# Patient Record
Sex: FEMALE | Race: WHITE | Employment: OTHER | URBAN - METROPOLITAN AREA
[De-identification: names, ages, dates, MRNs, and addresses within clinical notes are randomized per-mention and may not be internally consistent; named-entity substitution may affect disease eponyms.]

---

## 2024-06-26 ENCOUNTER — OFFICE VISIT (OUTPATIENT)
Dept: ORTHOPEDIC SURGERY | Age: 69
End: 2024-06-26
Payer: COMMERCIAL

## 2024-06-26 DIAGNOSIS — S52.041A CLOSED DISPLACED FRACTURE OF CORONOID PROCESS OF RIGHT ULNA, INITIAL ENCOUNTER: ICD-10-CM

## 2024-06-26 DIAGNOSIS — S53.124A CLOSED POSTERIOR DISLOCATION OF RIGHT ELBOW, INITIAL ENCOUNTER: Primary | ICD-10-CM

## 2024-06-26 DIAGNOSIS — S52.531A: ICD-10-CM

## 2024-06-26 PROCEDURE — 1123F ACP DISCUSS/DSCN MKR DOCD: CPT | Performed by: ORTHOPAEDIC SURGERY

## 2024-06-26 PROCEDURE — 99204 OFFICE O/P NEW MOD 45 MIN: CPT | Performed by: ORTHOPAEDIC SURGERY

## 2024-06-26 NOTE — PROGRESS NOTES
Name: Dot Howell  YOB: 1955  Gender: female  MRN: [id number]      What: Right elbow dislocation and a right wrist fracture  How: A fall  When: Late in the evening 6/7/2024 early 6/8/2024    Referring provider: Dr. Olivarez    HPI: Dot Howell is a 68 y.o. left-hand-dominant female seen at the request of Dr. Olivarez for right elbow and right wrist problems.  She has a history of hypertensive coronary artery disease, peripheral vascular disease with stents currently on Plavix, COPD currently smoking with a recent exacerbation, diabetes mellitus with recent sugars elevated in the 400s on metformin, MEHREEN.  Her osteopenia/osteoporosis status is unknown.  She denies any previous right elbow or right wrist problems.  She fell late 6/7/2024 early 6/8/2024 injuring her right elbow and right wrist.  She presented to the [facility] emergency room.  X-rays demonstrated a posterior right elbow dislocation and a displaced Colles' fracture right wrist.  She underwent a closed reduction of her right elbow and right wrist.  She was splinted.  Postreduction films demonstrated the elbow to be reduced and the wrist fracture to be adequately aligned.  She was admitted overnight for observation.  Apparently the next day she felt a pop and felt like the right elbow had redislocated.  She was discharged home 6/9/2024.  She was then readmitted 6/17-6/22/2024 for a COPD exacerbation.  She finally presented to follow-up to Dr. Olivarez's office today.  X-rays demonstrated a posterior right elbow dislocation with an associated coronoid fracture.  Due to the complexity of the injury I was contacted.  I was unaware that she had a concomitant right distal radius fracture.  She denies any numbness or tingling.  She denies any left-sided problems.      ROS/Meds/PSH/PMH/FH/SH: A ten system review of systems was performed and is negative other than what is in the HPI.   Tobacco:  reports that she has been smoking cigarettes. She has never

## 2024-06-27 ENCOUNTER — TELEPHONE (OUTPATIENT)
Dept: ORTHOPEDIC SURGERY | Age: 69
End: 2024-06-27

## 2024-06-28 NOTE — TELEPHONE ENCOUNTER
I called and spoke with patient, got her rescheduled for the next available clinic day 7/8/2024.  Patient voiced understanding.

## 2024-07-01 ENCOUNTER — TELEPHONE (OUTPATIENT)
Dept: ORTHOPEDIC SURGERY | Age: 69
End: 2024-07-01

## 2024-07-01 NOTE — TELEPHONE ENCOUNTER
Called and spoke with the patient, asked the patient if she would be able to do a morning appointment for her CT scan to be able to get an appointment sooner with Dr. Silvestre, she said she prefers the afternoon appointment.

## 2024-07-08 ENCOUNTER — TELEPHONE (OUTPATIENT)
Age: 69
End: 2024-07-08

## 2024-07-08 NOTE — TELEPHONE ENCOUNTER
We noticed patient canceled her appointment with Dr. Silvestre for today.  She is currently admitted at [facility].  I left a VM for patient advising that she get an ortho consult while she's in the hospital because of her dislocated elbow.  I advised her to please call us back with any questions/concerns.  I also left a VM for her emergency contact to please call us back because we are trying to get in touch with her.   Yes

## 2024-07-12 DIAGNOSIS — S52.041A CLOSED DISPLACED FRACTURE OF CORONOID PROCESS OF RIGHT ULNA, INITIAL ENCOUNTER: ICD-10-CM

## 2024-07-12 DIAGNOSIS — S53.124A CLOSED POSTERIOR DISLOCATION OF RIGHT ELBOW, INITIAL ENCOUNTER: ICD-10-CM

## 2024-07-15 ENCOUNTER — TELEPHONE (OUTPATIENT)
Age: 69
End: 2024-07-15

## 2024-07-15 NOTE — TELEPHONE ENCOUNTER
Per Dr. Silvestre, I called and spoke with patient.  Got her scheduled to see Dr. Silvestre on 7/23/2024 at 2:30 p.m. at [facility].  Patient voiced understanding, states she will call us if she is unable to make it.

## 2024-07-24 ENCOUNTER — TELEPHONE (OUTPATIENT)
Dept: ORTHOPEDIC SURGERY | Age: 69
End: 2024-07-24

## 2024-07-24 NOTE — TELEPHONE ENCOUNTER
Pt would like to reschedule her NP appt. I saw Pt has cx a couple times before. Is it okay to reschedule ?

## 2024-07-24 NOTE — TELEPHONE ENCOUNTER
Per Dr. Silvestre, I left a VM for patient stating I was calling to get her scheduled to see Dr. Silvestre.  I advised that the soonest we could see her would be next week  I also advised that because she's canceled so many appointments, if she cancels another one we may not be able to reschedule her.  Advised her to please call me back with any questions.

## 2024-07-30 ENCOUNTER — OFFICE VISIT (OUTPATIENT)
Age: 69
End: 2024-07-30
Payer: COMMERCIAL

## 2024-07-30 DIAGNOSIS — S53.124A CLOSED POSTERIOR DISLOCATION OF RIGHT ELBOW, INITIAL ENCOUNTER: Primary | ICD-10-CM

## 2024-07-30 DIAGNOSIS — S52.531A CLOSED COLLES' FRACTURE OF RIGHT RADIUS, INITIAL ENCOUNTER: ICD-10-CM

## 2024-07-30 DIAGNOSIS — M25.531 RIGHT WRIST PAIN: ICD-10-CM

## 2024-07-30 PROCEDURE — 1123F ACP DISCUSS/DSCN MKR DOCD: CPT | Performed by: ORTHOPAEDIC SURGERY

## 2024-07-30 PROCEDURE — 99205 OFFICE O/P NEW HI 60 MIN: CPT | Performed by: ORTHOPAEDIC SURGERY

## 2024-08-01 NOTE — PROGRESS NOTES
Orthopaedic Hand Clinic Note    Name: Dot Howell  YOB: 1955  Gender: female  MRN: [id number]      CC: Patient referred for evaluation of upper extremity pain    HPI: Dot Howell is a 68 y.o. female with a chief complaint of injury to her right wrist and elbow which occurred as a result of a fall on 6/8/2024.  She has a complicated past medical history of COPD and emphysema, with multiple recent episodes of acute respiratory failure, poorly controlled diabetes, A-fib, and she is a current smoker. she was seen in the emergency department and med.  She was found to have a right distal radius fracture as well as a posterior dislocation of the elbow.  She was reduced and splinted.  She was also admitted to the hospital at that time, and thinks she felt a pop the following day.  The elbow had likely redislocated at that time.  She was readmitted 6/17-6/22 for COPD exacerbation. She was seen by Dr. Vic Olivarez, who referred her to Dr. Guerrero for recurrent elbow dislocation, and she was then referred to me as she had an ipsilateral distal radius fracture.  Since Dr. Guerrero evaluated her on 6/26, we have made numerous attempts to expedite this patient's care. She has cancelled 4 appointments with me. She was readmitted to [facility] 7/6-7/12 for COPD exacerbation, acute respiratory failure, MEHREEN. She has been referred to Pulmonology when she was discharged from her 2 most recent hospitalizations, but has not made any attempt to see a Pulmonologist.  She was recommended to be on 2 L of oxygen, but adamantly refuses to wear it stating she does not need oxygen.  Patient had to sit down in the middle of the parking lot today because she could not walk into the office from the car.      ROS/Meds/PSH/PMH/FH/SH: I personally reviewed the patients standard intake form.  Pertinents are discussed in the HPI    Physical Examination:    Patient is pale, diaphoretic, somnolent, remained supine for the entirety of the exam.

## 2024-08-06 ENCOUNTER — TELEPHONE (OUTPATIENT)
Age: 69
End: 2024-08-06

## 2024-08-06 NOTE — TELEPHONE ENCOUNTER
I left a VM for patient stating I'm trying tot get her elbow surgery scheduled for 8/14/2024 which is next Wednesday.  I advised her that she will need to do a walk in pre-assessment, which I cannot schedule until I have the surgery date scheduled.  Advised her to please call me back and let me know if this works for her.

## 2024-08-09 ENCOUNTER — TELEPHONE (OUTPATIENT)
Dept: ORTHOPEDIC SURGERY | Age: 69
End: 2024-08-09

## 2024-08-09 NOTE — TELEPHONE ENCOUNTER
I spoke with Ms. Howell to see if she could have her sx this coming Wednesday, 8/14/24 but she would also need to do a walk-in pre-assessment to be cleared by anesthesia.  She stated that she is in the hospital for pneumonia but is supposed to be discharged today.  I then told her that we may not be able to do the sx b/c there are certain rules for people who have recently had any respiratory issues.  I told her I will have to check with Dr. Silvestre and get back with her either  today or Monday as Dr. Silvestre is in sx. She voiced understanding.

## 2024-08-13 NOTE — TELEPHONE ENCOUNTER
I lvm for Ms. Howell that per the anesthesiologist, she needs to wait 8 weeks for her sx since she was in the hospital with pneumonia.  I asked her to please call me back to let me know she received my message.  I told her we will get everything rescheduled and call her back with the new information.

## 2024-08-20 ENCOUNTER — TELEPHONE (OUTPATIENT)
Dept: ORTHOPEDIC SURGERY | Age: 69
End: 2024-08-20

## 2024-08-21 NOTE — TELEPHONE ENCOUNTER
I spoke with Ms. Howell to let her know about having to wait for her sx per the hospital anesthesiologist since she was in the hospital with pneumonia.  The soonest it can be done is Friday, Oct 4, 2024.  She stated the date worked for her. I told her we will get everything scheduled and call her back with more information.  She voiced understanding.

## 2024-08-21 NOTE — TELEPHONE ENCOUNTER
I lvm for Adriana, nurse with [facility], to let her know that I just spoke with Ms. Howell in regards to her sx and the earliest it can be done is 10/4/2024.

## 2024-09-24 ENCOUNTER — TELEPHONE (OUTPATIENT)
Age: 69
End: 2024-09-24

## 2024-09-24 DIAGNOSIS — S53.124A CLOSED POSTERIOR DISLOCATION OF RIGHT ELBOW, INITIAL ENCOUNTER: Primary | ICD-10-CM

## 2024-09-24 DIAGNOSIS — S52.041A CLOSED DISPLACED FRACTURE OF CORONOID PROCESS OF RIGHT ULNA, INITIAL ENCOUNTER: ICD-10-CM

## 2024-09-24 DIAGNOSIS — S52.041A CLOSED DISPLACED FRACTURE OF CORONOID PROCESS OF RIGHT ULNA, INITIAL ENCOUNTER: Primary | ICD-10-CM

## 2024-09-24 DIAGNOSIS — S53.124A CLOSED POSTERIOR DISLOCATION OF RIGHT ELBOW, INITIAL ENCOUNTER: ICD-10-CM

## 2024-09-25 ENCOUNTER — TELEPHONE (OUTPATIENT)
Dept: ORTHOPEDIC SURGERY | Age: 69
End: 2024-09-25

## 2024-09-26 ENCOUNTER — TELEPHONE (OUTPATIENT)
Dept: ORTHOPEDIC SURGERY | Age: 69
End: 2024-09-26

## 2024-09-30 ENCOUNTER — TELEPHONE (OUTPATIENT)
Dept: ORTHOPEDIC SURGERY | Age: 69
End: 2024-09-30

## 2024-09-30 NOTE — TELEPHONE ENCOUNTER
Patient sent a message thru out fax is cancelling apt today 9/30/24 looks like it's with [facility] Pre Ass

## 2024-10-01 ENCOUNTER — TELEPHONE (OUTPATIENT)
Age: 69
End: 2024-10-01

## 2024-10-01 NOTE — TELEPHONE ENCOUNTER
Patient called to ask what she needed to do about her elbow.  I advised her anesthesia has canceled her surgery due to her pneumonia.  Patient states she was made aware of this by the hospital.  She asked when she  needed to see . Friend, I advised her I would call and let her know once I found out more information about what all needs to happen to get her cleared for surgery.  Patient voiced understanding.

## 2024-10-10 ENCOUNTER — TELEPHONE (OUTPATIENT)
Dept: ORTHOPEDIC SURGERY | Age: 69
End: 2024-10-10

## 2024-10-10 DIAGNOSIS — S53.124A CLOSED POSTERIOR DISLOCATION OF RIGHT ELBOW, INITIAL ENCOUNTER: ICD-10-CM

## 2024-10-10 DIAGNOSIS — S52.041A CLOSED DISPLACED FRACTURE OF CORONOID PROCESS OF RIGHT ULNA, INITIAL ENCOUNTER: Primary | ICD-10-CM

## 2024-10-10 NOTE — TELEPHONE ENCOUNTER
I called and spoke with patient.  I advised her that unfortunately I cannot call her daughter because her daughter is not on her release of information form.  I advised that the only way I could speak with her daughter is if she gets her daughter on a 3 way call with our office.  Patient states she does not know how to do this.  I advised her I would check with my  and see if there's a way to get a written authorization for me to speak with her daughter and I will let her know.  Patient voiced understanding.

## 2024-10-10 NOTE — TELEPHONE ENCOUNTER
Her daughter is calling because Dr. Silvestre just called her and she has some questions. Please call.  Her daughter is Brayan Hernandez who lives in [place] [phone]. She just called her daughter crying and her daughter is her point of contact.

## 2024-10-11 NOTE — TELEPHONE ENCOUNTER
I called and spoke with patient.  Advised her she can go to [facility] urgent care in [place] and tell the  she needs to update her release of information paperwork so she can add her daughter to it.  Then, I will be able to call her daughter back.  She states she will do that tomorrow when she has a ride.

## 2024-10-14 ENCOUNTER — TELEPHONE (OUTPATIENT)
Dept: ORTHOPEDIC SURGERY | Age: 69
End: 2024-10-14

## 2024-10-15 ENCOUNTER — CLINICAL DOCUMENTATION (OUTPATIENT)
Age: 69
End: 2024-10-15

## 2024-10-15 DIAGNOSIS — S52.041A CLOSED DISPLACED FRACTURE OF CORONOID PROCESS OF RIGHT ULNA, INITIAL ENCOUNTER: Primary | ICD-10-CM

## 2024-10-15 DIAGNOSIS — S53.124A CLOSED POSTERIOR DISLOCATION OF RIGHT ELBOW, INITIAL ENCOUNTER: ICD-10-CM

## 2024-10-15 NOTE — PROGRESS NOTES
Referral faxed to [facility] as requested by patient.  She has an appointment with them on 10/28/2024.

## 2024-10-15 NOTE — TELEPHONE ENCOUNTER
I called and spoke with patient.  She states [place] is too far away and she would like to be referred to [facility].  I advised her I would send the referral.  Patient voiced understanding.

## 2024-11-26 ENCOUNTER — TELEPHONE (OUTPATIENT)
Dept: ORTHOPEDIC SURGERY | Age: 69
End: 2024-11-26

## 2024-11-26 NOTE — TELEPHONE ENCOUNTER
She says she missed a call from our office. I told her I don't see where anyone called but that I would send a message for you to try her again if you did.

## 2025-01-07 ENCOUNTER — TELEPHONE (OUTPATIENT)
Dept: ORTHOPEDIC SURGERY | Age: 70
End: 2025-01-07

## 2025-01-07 NOTE — TELEPHONE ENCOUNTER
Called the patient and informed her of the message below. Also gave her the number to [facility] to call and schedule.

## 2025-01-08 ENCOUNTER — CLINICAL DOCUMENTATION (OUTPATIENT)
Dept: ORTHOPEDIC SURGERY | Age: 70
End: 2025-01-08